# Patient Record
Sex: MALE | Race: WHITE | NOT HISPANIC OR LATINO | URBAN - METROPOLITAN AREA
[De-identification: names, ages, dates, MRNs, and addresses within clinical notes are randomized per-mention and may not be internally consistent; named-entity substitution may affect disease eponyms.]

---

## 2017-07-10 ENCOUNTER — OFFICE VISIT (OUTPATIENT)
Dept: CARDIOLOGY | Facility: CLINIC | Age: 42
End: 2017-07-10
Payer: OTHER GOVERNMENT

## 2017-07-10 VITALS
HEART RATE: 66 BPM | SYSTOLIC BLOOD PRESSURE: 131 MMHG | HEIGHT: 66 IN | WEIGHT: 192.44 LBS | BODY MASS INDEX: 30.93 KG/M2 | DIASTOLIC BLOOD PRESSURE: 83 MMHG

## 2017-07-10 DIAGNOSIS — R07.9 CHEST PAIN OF UNKNOWN ETIOLOGY: ICD-10-CM

## 2017-07-10 DIAGNOSIS — E78.00 HYPERCHOLESTEROLEMIA: ICD-10-CM

## 2017-07-10 DIAGNOSIS — Z87.891 HISTORY OF TOBACCO USE: Primary | ICD-10-CM

## 2017-07-10 DIAGNOSIS — R07.9 CHEST PAIN, UNSPECIFIED TYPE: ICD-10-CM

## 2017-07-10 DIAGNOSIS — R07.89 CHEST PAIN, ATYPICAL: ICD-10-CM

## 2017-07-10 PROCEDURE — 99205 OFFICE O/P NEW HI 60 MIN: CPT | Mod: PBBFAC | Performed by: INTERNAL MEDICINE

## 2017-07-10 PROCEDURE — 99999 PR PBB SHADOW E&M-NEW PATIENT-LVL V: CPT | Mod: PBBFAC,,, | Performed by: INTERNAL MEDICINE

## 2017-07-10 PROCEDURE — 93010 ELECTROCARDIOGRAM REPORT: CPT | Mod: S$PBB,,, | Performed by: INTERNAL MEDICINE

## 2017-07-10 PROCEDURE — 99204 OFFICE O/P NEW MOD 45 MIN: CPT | Mod: 25,S$PBB,, | Performed by: INTERNAL MEDICINE

## 2017-07-10 PROCEDURE — 93005 ELECTROCARDIOGRAM TRACING: CPT | Mod: PBBFAC | Performed by: INTERNAL MEDICINE

## 2017-07-10 RX ORDER — SIMVASTATIN 20 MG/1
20 TABLET, FILM COATED ORAL NIGHTLY
COMMUNITY
Start: 2017-07-08

## 2017-07-10 RX ORDER — OMEPRAZOLE 20 MG/1
20 CAPSULE, DELAYED RELEASE ORAL DAILY
COMMUNITY
Start: 2017-06-10

## 2017-07-10 RX ORDER — BUPROPION HYDROCHLORIDE 150 MG/1
TABLET, EXTENDED RELEASE ORAL 2 TIMES DAILY
COMMUNITY
Start: 2017-06-10

## 2017-07-10 RX ORDER — CETIRIZINE HYDROCHLORIDE 10 MG/1
10 TABLET ORAL DAILY
COMMUNITY

## 2017-07-10 RX ORDER — FLUTICASONE PROPIONATE 110 UG/1
1 AEROSOL, METERED RESPIRATORY (INHALATION) 2 TIMES DAILY
COMMUNITY

## 2017-07-10 NOTE — PROGRESS NOTES
" Patient ID:  Javier Chang is a 42 y.o. male who presents for evaluation of Chest Pain (intermittent x 12 months, worsening x 1 month)      The chest discomfort is localized on the left upper chest wall and below the left axilla; occasionally it is associated with tingling of the fingers of the left hand. The intensity of the discomfort is 1-3/10 and is not associated with nausea, shortness of breath or diaphoresis. Pt is physically active and runs marathon regularly (6 marathon competitions in 2017 to-date, including the [place] marathon 1 month ago) and physical activity does not trigger chest pain. There are no arm/shoulder movements that trigger the pain. He takes no medications to relieve the discomfort. It is noteworthy that 3 years ago he experienced trauma to the left chest wall in the area where he now experiences the discomfort. He was seen by his physician for this problem on 7/7 and at the time the EKG showed NSR with possible LVH, unchanged since 2012.    He has a positive family history for CAD. He has hypercholesterolemia for which he takes simvastatin. He used to smoke 5 cigarettes per day and stopped smoking one month ago      No results found for: NA, K, CL, CO2, BUN, CREATININE, GLU, HGBA1C, MG, AST, ALT, ALBUMIN, PROT, BILITOT, WBC, HGB, HCT, MCV, PLT, INR, PSA, TSH      No results found for: CHOL, HDL, TRIG    No results found for: LDLCALC    History reviewed. No pertinent past medical history.        Review of Systems   Cardiovascular: Positive for chest pain (atypical for angina). Negative for irregular heartbeat, leg swelling, near-syncope, orthopnea, palpitations and paroxysmal nocturnal dyspnea.   Respiratory: Negative for shortness of breath and sleep disturbances due to breathing.    Gastrointestinal: Negative for heartburn.        Objective:/83 (BP Location: Left arm, Patient Position: Sitting, BP Method: Automatic)   Pulse 66   Ht 5' 6" (1.676 m)   Wt 87.3 kg (192 lb 7.4 oz) "   BMI 31.06 kg/m²           Physical Exam   Skin:   No pretibial or ankle edema.       EKG: sinus bradycardia at a rate of 59/min. LVH by voltage.  Assessment:       1. Chest pain of unknown etiology    2. Hypercholesterolemia         Chest pain of unknown etiology  Chest pain is atypical for angina and no further cardiac work-up is indicated at this time.    Hypercholesterolemia  This problem is being treated with statin therapy and will be followed by the referring physician.    Plan:     Javier was seen today for chest pain.    Diagnoses and all orders for this visit:    Chest pain of unknown etiology    Hypercholesterolemia  -     Ambulatory referral to Smoking Cessation Program  -     Ambulatory referral to Smoking Cessation Program  -     Ambulatory referral to Smoking Cessation Program  -     Ambulatory referral to Smoking Cessation Program        Tobacco Use/Cessation:  I assessed Javier Chang and discussed smoking cessation with him for 3-10 minutes. He  History   Smoking Status    Former Smoker    Packs/day: 0.25    Quit date: 6/1/2017   Smokeless Tobacco    Never Used

## 2017-07-10 NOTE — ASSESSMENT & PLAN NOTE
This problem is being treated with statin therapy and will be followed by the referring physician.

## 2017-07-11 ENCOUNTER — TELEPHONE (OUTPATIENT)
Dept: CARDIOLOGY | Facility: CLINIC | Age: 42
End: 2017-07-11

## 2017-07-11 DIAGNOSIS — R07.89 CHEST PAIN, ATYPICAL: ICD-10-CM

## 2017-07-11 DIAGNOSIS — R07.9 CHEST PAIN, UNSPECIFIED TYPE: Primary | ICD-10-CM
